# Patient Record
Sex: MALE | Race: WHITE | NOT HISPANIC OR LATINO | URBAN - METROPOLITAN AREA
[De-identification: names, ages, dates, MRNs, and addresses within clinical notes are randomized per-mention and may not be internally consistent; named-entity substitution may affect disease eponyms.]

---

## 2024-08-06 ENCOUNTER — LAB (OUTPATIENT)
Dept: LAB | Facility: LAB | Age: 24
End: 2024-08-06
Payer: COMMERCIAL

## 2024-08-06 DIAGNOSIS — L70.0 ACNE VULGARIS: Primary | ICD-10-CM

## 2024-08-06 LAB
ALT SERPL W P-5'-P-CCNC: 21 U/L (ref 10–52)
AST SERPL W P-5'-P-CCNC: 19 U/L (ref 9–39)
CHOLEST SERPL-MCNC: 176 MG/DL (ref 0–199)
CHOLESTEROL/HDL RATIO: 2.2
HDLC SERPL-MCNC: 80.1 MG/DL
LDLC SERPL CALC-MCNC: 79 MG/DL
NON HDL CHOLESTEROL: 96 MG/DL (ref 0–149)
TRIGL SERPL-MCNC: 83 MG/DL (ref 0–149)
VLDL: 17 MG/DL (ref 0–40)

## 2024-08-06 PROCEDURE — 84460 ALANINE AMINO (ALT) (SGPT): CPT

## 2024-08-06 PROCEDURE — 36415 COLL VENOUS BLD VENIPUNCTURE: CPT

## 2024-08-06 PROCEDURE — 84450 TRANSFERASE (AST) (SGOT): CPT

## 2024-08-06 PROCEDURE — 80061 LIPID PANEL: CPT

## 2024-10-11 ENCOUNTER — LAB (OUTPATIENT)
Dept: LAB | Facility: LAB | Age: 24
End: 2024-10-11
Payer: COMMERCIAL

## 2024-10-11 DIAGNOSIS — L70.0 ACNE VULGARIS: Primary | ICD-10-CM

## 2024-10-11 LAB
ALT SERPL W P-5'-P-CCNC: 16 U/L (ref 10–52)
AST SERPL W P-5'-P-CCNC: 17 U/L (ref 9–39)
CHOLEST SERPL-MCNC: 147 MG/DL (ref 0–199)
CHOLESTEROL/HDL RATIO: 2.4
HDLC SERPL-MCNC: 60.3 MG/DL
LDLC SERPL CALC-MCNC: 72 MG/DL
NON HDL CHOLESTEROL: 87 MG/DL (ref 0–149)
TRIGL SERPL-MCNC: 76 MG/DL (ref 0–149)
VLDL: 15 MG/DL (ref 0–40)

## 2025-02-23 ENCOUNTER — HOSPITAL ENCOUNTER (EMERGENCY)
Facility: HOSPITAL | Age: 25
Discharge: HOME | End: 2025-02-23
Payer: COMMERCIAL

## 2025-02-23 VITALS
TEMPERATURE: 97.9 F | BODY MASS INDEX: 21.84 KG/M2 | WEIGHT: 185 LBS | HEIGHT: 77 IN | HEART RATE: 64 BPM | RESPIRATION RATE: 18 BRPM | OXYGEN SATURATION: 99 %
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
## 2025-02-23 DIAGNOSIS — S09.90XA CLOSED HEAD INJURY, INITIAL ENCOUNTER: ICD-10-CM

## 2025-02-23 DIAGNOSIS — S00.81XA ABRASION OF FOREHEAD, INITIAL ENCOUNTER: Primary | ICD-10-CM

## 2025-02-23 DIAGNOSIS — Z23 ENCOUNTER FOR IMMUNIZATION: ICD-10-CM

## 2025-02-23 PROBLEM — J34.2 DEVIATED NASAL SEPTUM: Status: ACTIVE | Noted: 2019-08-05

## 2025-02-23 PROBLEM — B00.9 HERPES SIMPLEX TYPE 1 ANTIBODY POSITIVE: Status: ACTIVE | Noted: 2025-02-23

## 2025-02-23 PROBLEM — T63.91XA VENOM-INDUCED ANAPHYLAXIS: Status: ACTIVE | Noted: 2025-02-23

## 2025-02-23 PROBLEM — S62.113A CLOSED FRACTURE OF TRIQUETRUM: Status: ACTIVE | Noted: 2025-02-23

## 2025-02-23 PROBLEM — J18.9 PNEUMONIA: Status: ACTIVE | Noted: 2025-02-23

## 2025-02-23 PROCEDURE — 90715 TDAP VACCINE 7 YRS/> IM: CPT | Performed by: PHYSICIAN ASSISTANT

## 2025-02-23 PROCEDURE — 99283 EMERGENCY DEPT VISIT LOW MDM: CPT | Mod: 25

## 2025-02-23 PROCEDURE — 90471 IMMUNIZATION ADMIN: CPT | Performed by: PHYSICIAN ASSISTANT

## 2025-02-23 PROCEDURE — 2500000001 HC RX 250 WO HCPCS SELF ADMINISTERED DRUGS (ALT 637 FOR MEDICARE OP): Performed by: PHYSICIAN ASSISTANT

## 2025-02-23 PROCEDURE — 2500000004 HC RX 250 GENERAL PHARMACY W/ HCPCS (ALT 636 FOR OP/ED): Performed by: PHYSICIAN ASSISTANT

## 2025-02-23 RX ORDER — CYCLOBENZAPRINE HCL 5 MG
5 TABLET ORAL ONCE
Status: COMPLETED | OUTPATIENT
Start: 2025-02-23 | End: 2025-02-23

## 2025-02-23 RX ORDER — ACETAMINOPHEN 325 MG/1
975 TABLET ORAL ONCE
Status: DISCONTINUED | OUTPATIENT
Start: 2025-02-23 | End: 2025-02-23

## 2025-02-23 RX ORDER — IBUPROFEN 600 MG/1
600 TABLET ORAL ONCE
Status: COMPLETED | OUTPATIENT
Start: 2025-02-23 | End: 2025-02-23

## 2025-02-23 RX ADMIN — TETANUS TOXOID, REDUCED DIPHTHERIA TOXOID AND ACELLULAR PERTUSSIS VACCINE, ADSORBED 0.5 ML: 5; 2.5; 8; 8; 2.5 SUSPENSION INTRAMUSCULAR at 23:22

## 2025-02-23 RX ADMIN — CYCLOBENZAPRINE HYDROCHLORIDE 5 MG: 5 TABLET, FILM COATED ORAL at 23:22

## 2025-02-23 RX ADMIN — IBUPROFEN 600 MG: 600 TABLET, FILM COATED ORAL at 23:22

## 2025-02-23 ASSESSMENT — PAIN DESCRIPTION - LOCATION: LOCATION: HEAD

## 2025-02-23 ASSESSMENT — COLUMBIA-SUICIDE SEVERITY RATING SCALE - C-SSRS
6. HAVE YOU EVER DONE ANYTHING, STARTED TO DO ANYTHING, OR PREPARED TO DO ANYTHING TO END YOUR LIFE?: NO
2. HAVE YOU ACTUALLY HAD ANY THOUGHTS OF KILLING YOURSELF?: NO
1. IN THE PAST MONTH, HAVE YOU WISHED YOU WERE DEAD OR WISHED YOU COULD GO TO SLEEP AND NOT WAKE UP?: NO

## 2025-02-23 ASSESSMENT — PAIN - FUNCTIONAL ASSESSMENT: PAIN_FUNCTIONAL_ASSESSMENT: 0-10

## 2025-02-23 ASSESSMENT — PAIN SCALES - GENERAL: PAINLEVEL_OUTOF10: 4

## 2025-02-23 ASSESSMENT — PAIN DESCRIPTION - PROGRESSION: CLINICAL_PROGRESSION: GRADUALLY IMPROVING

## 2025-02-24 NOTE — DISCHARGE INSTRUCTIONS
Please continue Tylenol 1 g every 4-6 hours and/or ibuprofen 600 mg every 6-8 hours as needed for pain.  Continue to keep wound clean dry.  Apply topical antibiotic ointment 1-2 times per day.  Get plenty of rest and stay well-hydrated.  Follow-up with primary care doctor in 2 to 5 days.  If concussion symptoms persist greater than 1 week please follow-up with concussion clinic.  [phone] call and asked to be scheduled with concussion specialist

## 2025-02-24 NOTE — ED PROVIDER NOTES
Chief Complaint   Patient presents with    Fall     Pt sts fell on ice yesterday. Has large abrasion to forehead. No active bleeding. Pt sts no loc but had ringing in ears yesterday. Currently c/o h/a, and neck pain. Pt ambulatory in triage appears in no acute distress.      HPI:   Arnaud Matt is an 24 y.o. male that presents to the ED with mother for evaluation of head injury.  Patient said around 1600 yesterday he was carrying a bag while he walked on ice he slipped and fell forward onto the ice.  There was no loss of consciousness but he did scrape his head.  He cleaned it with rubbing alcohol and Neosporin.  He has had slight headache, occasional ringing in his ears and today had some left shoulder and left-sided neck pain.  He otherwise denies any symptoms including no dizziness or lightheadedness, syncope, numbness, loss of sensation, muscle weakness, vision changes, speech changes, otorrhea, chest pain, shortness of breath, fevers.  Last tetanus was greater than 5 years.  He took 500 mg of Tylenol about 5 hours ago with no relief.    Medications: Taking Accutane 1 week ago  Soc HX:  Allergies   Allergen Reactions    Amoxicillin Hives   :  Past Medical History:   Diagnosis Date    Displaced fracture of neck of unspecified radius, initial encounter for closed fracture 12/16/2014    Radial neck fracture    Displaced fracture of triquetrum (cuneiform) bone, right wrist, initial encounter for closed fracture 07/14/2014    Fracture of triquetrum of right wrist, closed     History reviewed. No pertinent surgical history.  No family history on file.     Physical Exam  Vitals and nursing note reviewed.   Constitutional:       General: He is not in acute distress.     Appearance: Normal appearance. He is not ill-appearing or toxic-appearing.   HENT:      Head:      Comments: No signs of basilar skull fracture.  No palpable skull deformities.  He has a 5-1/2 cm x 4 cm abrasion on the left forehead     Right Ear: Tympanic  membrane, ear canal and external ear normal.      Left Ear: Tympanic membrane, ear canal and external ear normal.      Ears:      Comments: No hemotympanum     Nose: Nose normal.      Mouth/Throat:      Mouth: Mucous membranes are moist.   Eyes:      Extraocular Movements: Extraocular movements intact.      Pupils: Pupils are equal, round, and reactive to light.      Comments: No pain or dizziness with eye movement.  No nystagmus.   Cardiovascular:      Rate and Rhythm: Normal rate and regular rhythm.      Pulses: Normal pulses.      Heart sounds: Normal heart sounds.   Pulmonary:      Effort: Pulmonary effort is normal. No respiratory distress.      Breath sounds: Normal breath sounds.   Musculoskeletal:         General: Normal range of motion.      Cervical back: Normal range of motion. Tenderness (Left-sided paraspinal TTP) present.      Comments: Symmetric strength bilateral upper extremities   Skin:     General: Skin is warm and dry.      Comments: Abrasion left forehead   Neurological:      Mental Status: He is alert.      Cranial Nerves: No cranial nerve deficit.     VS: As documented in the triage note and EMR flowsheet from this visit were reviewed.      Medical Decision Making:   ED Course as of 02/23/25 2319   Sun Feb 23, 2025 2227 Vitals Reviewed: Afebrile. Not tachycardic nor tachypneic. No hypoxia.   [KA]   2313 Patient is 24-year-old male that presents to the ED for evaluation of head injury.  On exam patient is neuro vastly intact.  He has symmetric strength and sensation of bilateral upper and lower extremities.  No midline spinal tenderness but does have left-sided paraspinal TTP.  He has a roughly 5-1/2 x 4 cm abrasion on the left forehead.  No hemotympanum.  [place] head CT and [facility] do not recommend imaging.  We discussed concussion precautions.  Patient to be given ibuprofen and Flexeril in the ED.  Will update tetanus.  Advised continuing to use ibuprofen, Tylenol as needed.  Did  not want Flexeril sent home with him.  Recommended keeping wound clean and dry and applying topical antibiotic ointment 1-2 times per day.  Following up with concussion specialist if symptoms persist greater than 1 week.  Patient agreeable plan. [KA]      ED Course User Index  [KA] Janet Castillo PA-C         Diagnoses as of 02/23/25 2319   Abrasion of forehead, initial encounter   Closed head injury, initial encounter   Fall due to slipping on ice or snow, initial encounter   Encounter for immunization      Escalation of Care: Appropriate for outpatient management     Counseling: Spoke with the patient and discussed today´s findings, in addition to providing specific details for the plan of care and expected course.  Patient was given the opportunity to ask questions.    Discussed return precautions and importance of follow-up.  Advised to follow-up with PCP.  Advised to return to the ED for changing or worsening symptoms, new symptoms, complaint specific precautions, and precautions listed on the discharge paperwork.  Educated on the common potential side effects of medications prescribed.    I advised the patient that the emergency evaluation and treatment provided today doesn't end their need for medical care. It is very important that they follow-up with their primary care provider or other specialist as instructed.    The plan of care was mutually agreed upon with the patient. The patient and/or family were given the opportunity to ask questions. All questions asked today in the ED were answered to the best of my ability with today's information.    I specifically advised the patient to return to the ED for changing or worsening symptoms, worrisome new symptoms, or for any complaint specific precautions listed on the discharge paperwork.    This patient was cared for in the setting of nationwide stress on resources and staffing.    This report was transcribed using voice recognition software.  Every effort  was made to ensure accuracy, however, inadvertently computerized transcription errors may be present.       Janet Castillo PA-C  02/23/25 [id number]

## 2025-06-24 ENCOUNTER — APPOINTMENT (OUTPATIENT)
Dept: GASTROENTEROLOGY | Facility: CLINIC | Age: 25
End: 2025-06-24
Payer: COMMERCIAL

## 2025-06-24 VITALS — HEIGHT: 77 IN | WEIGHT: 184 LBS | HEART RATE: 67 BPM | BODY MASS INDEX: 21.73 KG/M2

## 2025-06-24 DIAGNOSIS — Z12.11 ENCOUNTER FOR SCREENING FOR MALIGNANT NEOPLASM OF COLON: ICD-10-CM

## 2025-06-24 DIAGNOSIS — Z80.0 FAMILY HISTORY OF COLON CANCER: Primary | ICD-10-CM

## 2025-06-24 PROCEDURE — 99203 OFFICE O/P NEW LOW 30 MIN: CPT | Performed by: INTERNAL MEDICINE

## 2025-06-24 PROCEDURE — 1036F TOBACCO NON-USER: CPT | Performed by: INTERNAL MEDICINE

## 2025-06-24 PROCEDURE — 3008F BODY MASS INDEX DOCD: CPT | Performed by: INTERNAL MEDICINE

## 2025-06-24 RX ORDER — POLYETHYLENE GLYCOL 3350, SODIUM SULFATE ANHYDROUS, SODIUM BICARBONATE, SODIUM CHLORIDE, POTASSIUM CHLORIDE 236; 22.74; 6.74; 5.86; 2.97 G/4L; G/4L; G/4L; G/4L; G/4L
4 POWDER, FOR SOLUTION ORAL ONCE
Qty: 4000 ML | Refills: 0 | Status: SHIPPED | OUTPATIENT
Start: 2025-06-24 | End: 2025-06-24

## 2025-06-24 ASSESSMENT — ENCOUNTER SYMPTOMS: SHORTNESS OF BREATH: 0

## 2025-06-24 NOTE — PROGRESS NOTES
REASON FOR VISIT:  Family history colon cnacer   PCP (requesting provider): José Miguel Jurado MD.    HPI:  Arnaud Matt is a 24 y.o. male being evaluated for family history of colon cancer. Esophagram normal (7/2021).    The patient reports  his father has a history of Schultz syndrome and had colon cancer at age 30. Patient had genetic testing and was negative for Schultz syndrome. Patient has never had EGD or colonoscopy. Patient has a BM once daily. No blood in the stool. No abdominal pain. No issues with sedation. No blood thinner. No neck surgeries. No chronic medical issues. No regular NSAIDs.    PSurgHx: No abdominal surgeries     FamHx: Father with Schultz syndrome with duodenal and colon cancer.     PAST MEDICAL HISTORY  Medical History[1]    PAST SURGICAL HISTORY  Surgical History[2]    FAMILY HISTORY  Family History[3]    SOCIAL HISTORY   has no history on file for tobacco use, alcohol use, and drug use.    REVIEW OF SYSTEMS  Review of Systems   Respiratory:  Negative for shortness of breath.    Cardiovascular:  Negative for chest pain.   All other systems reviewed and are negative.    A 10+ point review of systems was otherwise negative except as noted and per HPI.    ALLERGIES  Allergies[4]    MEDICATIONS  No current outpatient medications    VITALS  There were no vitals filed for this visit.   There is no height or weight on file to calculate BMI.    PHYSICAL EXAM  CONSTITUTIONAL: NAD, appears stated age  EYES: anicteric sclera, sclera clear  HEAD: normocephalic, atraumatic   NECK: supple   PULMONARY: CTAB  CARDIOVASCULAR: RRR, no M/R/G appreciated   ABDOMEN: soft, NTND, +BS, no rebound or guarding   MUSCULOSKELETAL: no edema  SKIN: no jaundice   PSYCHIATRIC: AOx3, appropriate insight and judgement    LABS  WBC (x10E9/L)   Date Value   07/22/2019 4.5     Hemoglobin (g/dL)   Date Value   07/22/2019 16.7     Platelets (x10E9/L)   Date Value   07/22/2019 153     ALT (U/L)   Date Value   10/11/2024 16   08/06/2024  "21     AST (U/L)   Date Value   10/11/2024 17   08/06/2024 19     No results found for: \"LIPASE\", \"CRP\"    ASSESSMENT/PLAN  Arnaud Matt is a 24 y.o. male with a past medical history of being evaluated for family history of colon cancer. Based on guidelines of starting screening twn years prior to diagnosis of colon cancer in first degree relative, the patient is due for high risk colon cancer screening given family history of colon cancer in his father at age 30.    -Plan for colonoscopy  -The procedure(s) including risks/benefits, diet restrictions, prep, and sedation were discussed with the patient    Follow-up will be at the time of the colonoscopy.     Signature: David Garrison MD       [1]   Past Medical History:  Diagnosis Date    Displaced fracture of neck of unspecified radius, initial encounter for closed fracture 12/16/2014    Radial neck fracture    Displaced fracture of triquetrum (cuneiform) bone, right wrist, initial encounter for closed fracture 07/14/2014    Fracture of triquetrum of right wrist, closed   [2] No past surgical history on file.  [3] No family history on file.  [4]   Allergies  Allergen Reactions    Amoxicillin Hives     "

## 2025-07-18 ENCOUNTER — APPOINTMENT (OUTPATIENT)
Dept: GASTROENTEROLOGY | Facility: EXTERNAL LOCATION | Age: 25
End: 2025-07-18
Payer: COMMERCIAL

## 2025-08-01 ENCOUNTER — APPOINTMENT (OUTPATIENT)
Dept: GASTROENTEROLOGY | Facility: EXTERNAL LOCATION | Age: 25
End: 2025-08-01
Payer: COMMERCIAL

## 2025-08-15 ENCOUNTER — APPOINTMENT (OUTPATIENT)
Dept: GASTROENTEROLOGY | Facility: EXTERNAL LOCATION | Age: 25
End: 2025-08-15
Payer: COMMERCIAL

## 2025-09-05 ENCOUNTER — APPOINTMENT (OUTPATIENT)
Dept: GASTROENTEROLOGY | Facility: EXTERNAL LOCATION | Age: 25
End: 2025-09-05
Payer: COMMERCIAL

## 2025-10-17 ENCOUNTER — APPOINTMENT (OUTPATIENT)
Dept: GASTROENTEROLOGY | Facility: EXTERNAL LOCATION | Age: 25
End: 2025-10-17
Payer: COMMERCIAL